# Patient Record
Sex: MALE | Race: OTHER | NOT HISPANIC OR LATINO
[De-identification: names, ages, dates, MRNs, and addresses within clinical notes are randomized per-mention and may not be internally consistent; named-entity substitution may affect disease eponyms.]

---

## 2024-10-23 NOTE — ASU PATIENT PROFILE, ADULT - NSICDXPASTSURGICALHX_GEN_ALL_CORE_FT
PAST SURGICAL HISTORY:  No significant past surgical history PAST SURGICAL HISTORY:  S/P tooth extraction Needed more local

## 2024-10-23 NOTE — ASU PATIENT PROFILE, ADULT - NSICDXPASTMEDICALHX_GEN_ALL_CORE_FT
PAST MEDICAL HISTORY:  No pertinent past medical history PAST MEDICAL HISTORY:  MARKOS (obstructive sleep apnea)     Swallowing problem

## 2024-10-24 ENCOUNTER — RESULT REVIEW (OUTPATIENT)
Age: 34
End: 2024-10-24

## 2024-10-24 ENCOUNTER — TRANSCRIPTION ENCOUNTER (OUTPATIENT)
Age: 34
End: 2024-10-24

## 2024-10-24 ENCOUNTER — APPOINTMENT (OUTPATIENT)
Dept: OTOLARYNGOLOGY | Facility: AMBULATORY SURGERY CENTER | Age: 34
End: 2024-10-24

## 2024-10-24 ENCOUNTER — OUTPATIENT (OUTPATIENT)
Dept: OUTPATIENT SERVICES | Facility: HOSPITAL | Age: 34
LOS: 1 days | Discharge: HOME | End: 2024-10-24
Payer: COMMERCIAL

## 2024-10-24 ENCOUNTER — INPATIENT (INPATIENT)
Facility: HOSPITAL | Age: 34
LOS: 1 days | Discharge: HOME | DRG: 156 | End: 2024-10-26
Attending: OTOLARYNGOLOGY | Admitting: OTOLARYNGOLOGY
Payer: COMMERCIAL

## 2024-10-24 VITALS
OXYGEN SATURATION: 97 % | HEART RATE: 83 BPM | RESPIRATION RATE: 22 BRPM | SYSTOLIC BLOOD PRESSURE: 137 MMHG | DIASTOLIC BLOOD PRESSURE: 88 MMHG

## 2024-10-24 VITALS
HEIGHT: 71 IN | HEART RATE: 83 BPM | WEIGHT: 265.88 LBS | DIASTOLIC BLOOD PRESSURE: 81 MMHG | TEMPERATURE: 98 F | OXYGEN SATURATION: 96 % | RESPIRATION RATE: 16 BRPM | SYSTOLIC BLOOD PRESSURE: 125 MMHG

## 2024-10-24 VITALS
TEMPERATURE: 98 F | SYSTOLIC BLOOD PRESSURE: 122 MMHG | RESPIRATION RATE: 15 BRPM | HEART RATE: 72 BPM | OXYGEN SATURATION: 95 % | DIASTOLIC BLOOD PRESSURE: 67 MMHG

## 2024-10-24 DIAGNOSIS — K08.409 PARTIAL LOSS OF TEETH, UNSPECIFIED CAUSE, UNSPECIFIED CLASS: Chronic | ICD-10-CM

## 2024-10-24 LAB
ANION GAP SERPL CALC-SCNC: 11 MMOL/L — SIGNIFICANT CHANGE UP (ref 5–17)
APTT BLD: 34.9 SEC — SIGNIFICANT CHANGE UP (ref 24.5–35.6)
BLD GP AB SCN SERPL QL: NEGATIVE — SIGNIFICANT CHANGE UP
BLD GP AB SCN SERPL QL: NEGATIVE — SIGNIFICANT CHANGE UP
BUN SERPL-MCNC: 15 MG/DL — SIGNIFICANT CHANGE UP (ref 7–23)
CALCIUM SERPL-MCNC: 9 MG/DL — SIGNIFICANT CHANGE UP (ref 8.4–10.5)
CHLORIDE SERPL-SCNC: 102 MMOL/L — SIGNIFICANT CHANGE UP (ref 96–108)
CO2 SERPL-SCNC: 22 MMOL/L — SIGNIFICANT CHANGE UP (ref 22–31)
CREAT SERPL-MCNC: 1.02 MG/DL — SIGNIFICANT CHANGE UP (ref 0.5–1.3)
EGFR: 100 ML/MIN/1.73M2 — SIGNIFICANT CHANGE UP
GLUCOSE BLDC GLUCOMTR-MCNC: 125 MG/DL — HIGH (ref 70–99)
GLUCOSE SERPL-MCNC: 138 MG/DL — HIGH (ref 70–99)
HCT VFR BLD CALC: 45.4 % — SIGNIFICANT CHANGE UP (ref 39–50)
HGB BLD-MCNC: 14.9 G/DL — SIGNIFICANT CHANGE UP (ref 13–17)
INR BLD: 1.04 — SIGNIFICANT CHANGE UP (ref 0.85–1.16)
MAGNESIUM SERPL-MCNC: 2 MG/DL — SIGNIFICANT CHANGE UP (ref 1.6–2.6)
MCHC RBC-ENTMCNC: 30.2 PG — SIGNIFICANT CHANGE UP (ref 27–34)
MCHC RBC-ENTMCNC: 32.8 GM/DL — SIGNIFICANT CHANGE UP (ref 32–36)
MCV RBC AUTO: 92.1 FL — SIGNIFICANT CHANGE UP (ref 80–100)
NRBC # BLD: 0 /100 WBCS — SIGNIFICANT CHANGE UP (ref 0–0)
PHOSPHATE SERPL-MCNC: 2.7 MG/DL — SIGNIFICANT CHANGE UP (ref 2.5–4.5)
PLATELET # BLD AUTO: 335 K/UL — SIGNIFICANT CHANGE UP (ref 150–400)
POTASSIUM SERPL-MCNC: 4.2 MMOL/L — SIGNIFICANT CHANGE UP (ref 3.5–5.3)
POTASSIUM SERPL-SCNC: 4.2 MMOL/L — SIGNIFICANT CHANGE UP (ref 3.5–5.3)
PROTHROM AB SERPL-ACNC: 12 SEC — SIGNIFICANT CHANGE UP (ref 9.9–13.4)
RBC # BLD: 4.93 M/UL — SIGNIFICANT CHANGE UP (ref 4.2–5.8)
RBC # FLD: 12.8 % — SIGNIFICANT CHANGE UP (ref 10.3–14.5)
RH IG SCN BLD-IMP: POSITIVE — SIGNIFICANT CHANGE UP
RH IG SCN BLD-IMP: POSITIVE — SIGNIFICANT CHANGE UP
SODIUM SERPL-SCNC: 135 MMOL/L — SIGNIFICANT CHANGE UP (ref 135–145)
WBC # BLD: 11.22 K/UL — HIGH (ref 3.8–10.5)
WBC # FLD AUTO: 11.22 K/UL — HIGH (ref 3.8–10.5)

## 2024-10-24 PROCEDURE — 88304 TISSUE EXAM BY PATHOLOGIST: CPT | Mod: 26

## 2024-10-24 PROCEDURE — 99291 CRITICAL CARE FIRST HOUR: CPT | Mod: GC

## 2024-10-24 PROCEDURE — 88305 TISSUE EXAM BY PATHOLOGIST: CPT | Mod: 26

## 2024-10-24 PROCEDURE — 42145 REPAIR PALATE PHARYNX/UVULA: CPT | Mod: GC

## 2024-10-24 RX ORDER — LIDOCAINE 50 MG/G
15 CREAM TOPICAL
Qty: 2 | Refills: 0
Start: 2024-10-24 | End: 2024-11-02

## 2024-10-24 RX ORDER — GABAPENTIN 800 MG/1
6 TABLET, FILM COATED ORAL
Qty: 180 | Refills: 0
Start: 2024-10-24 | End: 2024-11-02

## 2024-10-24 RX ORDER — LIDOCAINE HYDROCHLORIDE 40 MG/ML
20 SOLUTION TOPICAL ONCE
Refills: 0 | Status: COMPLETED | OUTPATIENT
Start: 2024-10-24 | End: 2024-10-24

## 2024-10-24 RX ORDER — HEPARIN SODIUM 10000 [USP'U]/ML
7500 INJECTION INTRAVENOUS; SUBCUTANEOUS EVERY 8 HOURS
Refills: 0 | Status: DISCONTINUED | OUTPATIENT
Start: 2024-10-24 | End: 2024-10-26

## 2024-10-24 RX ORDER — LIDOCAINE HCL 60 MG/3 ML
10 SYRINGE (ML) INJECTION ONCE
Refills: 0 | Status: DISCONTINUED | OUTPATIENT
Start: 2024-10-24 | End: 2024-10-24

## 2024-10-24 RX ORDER — INSULIN LISPRO 100/ML
VIAL (ML) SUBCUTANEOUS EVERY 6 HOURS
Refills: 0 | Status: DISCONTINUED | OUTPATIENT
Start: 2024-10-24 | End: 2024-10-26

## 2024-10-24 RX ORDER — HEPARIN SODIUM 10000 [USP'U]/ML
5000 INJECTION INTRAVENOUS; SUBCUTANEOUS EVERY 8 HOURS
Refills: 0 | Status: DISCONTINUED | OUTPATIENT
Start: 2024-10-24 | End: 2024-10-24

## 2024-10-24 RX ORDER — OXYCODONE HYDROCHLORIDE 30 MG/1
1 TABLET, FILM COATED, EXTENDED RELEASE ORAL
Qty: 20 | Refills: 0
Start: 2024-10-24

## 2024-10-24 RX ORDER — DEXAMETHASONE 1.5 MG 1.5 MG/1
6 TABLET ORAL EVERY 8 HOURS
Refills: 0 | Status: DISCONTINUED | OUTPATIENT
Start: 2024-10-24 | End: 2024-10-26

## 2024-10-24 RX ORDER — METHYLPREDNISOLONE ACETATE 80 MG/ML
1 INJECTION, SUSPENSION INTRA-ARTICULAR; INTRALESIONAL; INTRAMUSCULAR; SOFT TISSUE
Qty: 1 | Refills: 0
Start: 2024-10-24

## 2024-10-24 RX ORDER — FENTANYL CITRATE-0.9 % NACL/PF 300MCG/30
25 PATIENT CONTROLLED ANALGESIA VIAL INJECTION
Refills: 0 | Status: DISCONTINUED | OUTPATIENT
Start: 2024-10-24 | End: 2024-10-24

## 2024-10-24 RX ORDER — CHLORHEXIDINE GLUCONATE 40 MG/ML
1 SOLUTION TOPICAL DAILY
Refills: 0 | Status: DISCONTINUED | OUTPATIENT
Start: 2024-10-24 | End: 2024-10-26

## 2024-10-24 RX ORDER — SODIUM CHLORIDE IRRIG SOLUTION 0.9 %
500 SOLUTION, IRRIGATION IRRIGATION
Refills: 0 | Status: DISCONTINUED | OUTPATIENT
Start: 2024-10-24 | End: 2024-10-24

## 2024-10-24 RX ORDER — ACETAMINOPHEN 500 MG
1000 TABLET ORAL ONCE
Refills: 0 | Status: COMPLETED | OUTPATIENT
Start: 2024-10-25 | End: 2024-10-25

## 2024-10-24 RX ORDER — GLUCAGON INJECTION, SOLUTION 1 MG/.2ML
1 INJECTION, SOLUTION SUBCUTANEOUS ONCE
Refills: 0 | Status: DISCONTINUED | OUTPATIENT
Start: 2024-10-24 | End: 2024-10-26

## 2024-10-24 RX ORDER — AMOXICILLIN 250 MG/5ML
10 SUSPENSION, RECONSTITUTED, ORAL (ML) ORAL
Qty: 2 | Refills: 0
Start: 2024-10-24 | End: 2024-10-30

## 2024-10-24 RX ORDER — APREPITANT 125MG-80MG
40 KIT ORAL ONCE
Refills: 0 | Status: COMPLETED | OUTPATIENT
Start: 2024-10-24 | End: 2024-10-24

## 2024-10-24 RX ORDER — ACETAMINOPHEN 325 MG
20 TABLET ORAL
Qty: 800 | Refills: 0
Start: 2024-10-24 | End: 2024-11-02

## 2024-10-24 RX ORDER — SODIUM CHLORIDE IRRIG SOLUTION 0.9 %
500 SOLUTION, IRRIGATION IRRIGATION
Refills: 0 | Status: COMPLETED | OUTPATIENT
Start: 2024-10-24 | End: 2024-10-24

## 2024-10-24 RX ORDER — ACETAMINOPHEN 500 MG
1000 TABLET ORAL ONCE
Refills: 0 | Status: COMPLETED | OUTPATIENT
Start: 2024-10-24 | End: 2024-10-24

## 2024-10-24 RX ADMIN — Medication 100 MILLILITER(S): at 21:00

## 2024-10-24 RX ADMIN — Medication 400 MILLIGRAM(S): at 21:02

## 2024-10-24 RX ADMIN — Medication 25 MICROGRAM(S): at 15:31

## 2024-10-24 RX ADMIN — Medication 25 MICROGRAM(S): at 15:15

## 2024-10-24 RX ADMIN — LIDOCAINE HYDROCHLORIDE 20 MILLILITER(S): 40 SOLUTION TOPICAL at 22:16

## 2024-10-24 RX ADMIN — Medication 999 MILLILITER(S): at 15:24

## 2024-10-24 RX ADMIN — APREPITANT 40 MILLIGRAM(S): KIT at 09:25

## 2024-10-24 RX ADMIN — Medication 1000 MILLIGRAM(S): at 21:53

## 2024-10-24 RX ADMIN — HEPARIN SODIUM 7500 UNIT(S): 10000 INJECTION INTRAVENOUS; SUBCUTANEOUS at 22:17

## 2024-10-24 RX ADMIN — Medication 25 MICROGRAM(S): at 15:44

## 2024-10-24 RX ADMIN — DEXAMETHASONE 1.5 MG 6 MILLIGRAM(S): 1.5 TABLET ORAL at 21:00

## 2024-10-24 NOTE — H&P ADULT - HISTORY OF PRESENT ILLNESS
34yM with MARKOS s/p tonsillectomy and UPPP at [facility] 10/24 transferred to [facility] for overnight monitoring due to uvular edema, pain, and inability to tolerate PO. Patient was transferred for ICU level of care due to O2 requirements in setting of uvula edema. On arrival patient is comfortable and breathing comfortably on 2L NC satting 96%. Reports that the pain is unchanged. He reports difficulty breathing through his nose but can breathe well through his mouth. No stridor or stertor.

## 2024-10-24 NOTE — ASU DISCHARGE PLAN (ADULT/PEDIATRIC) - ASU DC SPECIAL INSTRUCTIONSFT
Soft diet for 2 weeks  Tylenol and Motrin as needed for pain control    Oxycodone every 4-6 hours as needed for severe pain  Call Dr. Beatty's office for follow-up. Soft diet for 2 weeks  Tylenol and Motrin as needed for pain control.  Gargle 3 times daily with ice water.   Take your antibiotic and steroid medication as prescribed.  Call Dr. Beatty's office for follow-up. Soft diet for 2 weeks  Tylenol and Motrin as needed for pain control.  Gargle 3 times daily with ice water. Gargle with viscous lidocaine when you have throat pain.  Take your antibiotic and steroid medication as prescribed.  Call Dr. Beatty's office for follow-up.

## 2024-10-24 NOTE — H&P ADULT - NSHPPHYSICALEXAM_GEN_ALL_CORE
ICU Vital Signs Last 24 Hrs  T(C): 36.8 (24 Oct 2024 19:20), Max: 36.8 (24 Oct 2024 09:02)  T(F): 98.2 (24 Oct 2024 19:20), Max: 98.2 (24 Oct 2024 09:02)  HR: 83 (24 Oct 2024 20:00) (70 - 83)  BP: 137/88 (24 Oct 2024 20:00) (79/52 - 137/88)  BP(mean): 108 (24 Oct 2024 20:00) (108 - 108)  ABP: --  ABP(mean): --  RR: 22 (24 Oct 2024 20:00) (15 - 22)  SpO2: 97% (24 Oct 2024 20:00) (92% - 97%)    O2 Parameters below as of 24 Oct 2024 20:00  Patient On (Oxygen Delivery Method): nasal cannula  O2 Flow (L/min): 2        PHYSICAL EXAM:    CONSTITUTIONAL: Well nourished, well developed, NON-DYSMORPHIC, and in no acute distress.    HEAD: normocephalic, atraumatic.  EARS: The right/left pinna was normal.   NOSE: Normal external nose. Anterior nasal cavity patent with no obstruction. Inferior turbinates normally sized.  ORAL CAVITY/OROPHARYNX: s/p tonsillectomy and UPPP. No bleeding. Significant uvular edema  NECK: No cervical lymphadenopathy  RESPIRATORY: Respirations unlabored, no increased work of breathing with use of accessory muscles and retractions. No stridor.  CARDIAC: Warm extremities, no cyanosis.

## 2024-10-24 NOTE — ASU DISCHARGE PLAN (ADULT/PEDIATRIC) - FINANCIAL ASSISTANCE
[facility] provides services at a reduced cost to those who are determined to be eligible through [facility]’s financial assistance program. Information regarding [facility]’s financial assistance program can be found by going to [email or web address] or by calling [phone].

## 2024-10-24 NOTE — BRIEF OPERATIVE NOTE - OPERATION/FINDINGS
uvulopalatopharyngoplasty  bl tonsillectomy uvulopalatopharyngoplasty  bl tonsillectomy  Uvula remnant swollen

## 2024-10-24 NOTE — CONSULT NOTE ADULT - SUBJECTIVE AND OBJECTIVE BOX
====SURGERY ICU CONSULT====      RADHA LINARES  [id number]    HPI:   35yo M  w/ PMH MARKOS, MO, GERD and no PSH who presented to [facility] for elective uvulopalatopharyngoplasty and tonsillectomy in setting of MARKOS and poss chronic cryptic tonsilitis. Pt now s/p  uvulopalatopharyngoplasty and tonsillectomy (1L crystal, EBL 10) c/b dysphagia in PACU w/ no sign of respiratory difficulty or stridor.     Surgical ICU consulted for airway monitoring in setting of orophryngeal surgery and postoperative dysphagia. Pt seen and examined at bedside w/ ICU team. Pt lying comfortably in bed, no sign of inc WOB, difficulty breathing or stridor.     On exam, oropharynx is edematous w/ no visbile sign of obstruction       PMH: none  PSH: no prior surgical hx  Meds: none, prescribed CPAP at home however does not use it   Allergies: NKDA            LABS:                    GENERAL: NAD, lying in bed comfortably  HEAD:  Atraumatic, normocephalic  ORAL: visibly edematous orppharynx w/ no sign of obstruction   EYES: EOMI, PERRLA, conjunctiva and sclera clear  NECK: Supple, trachea midline, no JVD, no stridor auscultated   HEART: Regular rate and rhythm, no murmurs, rubs, or gallops  LUNGS: Unlabored respirations.  Clear to auscultation bilaterally, no crackles, wheezing, or rhonchi  ABDOMEN: Soft, nontender, nondistended, +BS  EXTREMITIES: 2+ peripheral pulses bilaterally. No clubbing, cyanosis, or edema  NERVOUS SYSTEM:  A&Ox3, moving all extremities, no focal deficits   SKIN: No rashes or lesions          35yo M  w/ PMH MARKOS, MO, GERD and no PSH who presented to [facility] for elective uvulopalatopharyngoplasty and tonsillectomy in setting of MARKOS and poss chronic cryptic tonsilitis. Pt now s/p  uvulopalatopharyngoplasty and tonsillectomy (1L crystal, EBL 10) c/b dysphagia in PACU w/ no sign of respiratory difficulty or stridor. Pt doing well, denies difficulty breathing. Endorses continued difficulty w/ swallowing; will give viscous lidocaine for symptomatic relief and start decadron 6q8 to dec edema.     Neuro: Tylenol and Toradol PRN  HEENT: Palital and posterior pharynx swelling: Will get viscous lidocaine to help with pain. Cont Decadron 6q8 standing    CV: HD stable LR @100   Pulm: Hx of MARKOS- cont CPAP 40%10/5  GI: NPO   : Voids  ID: None  Endo: ISS  PPx: SCD no SQH  Lines: piv  Wounds: Oral Incisions c/d/i  No drains   PT/OT: Not ordered   ====SURGERY ICU CONSULT====      RADHA LINARES  [id number]    HPI:   35yo M  w/ PMH MARKOS, MO, GERD and no PSH who presented to [facility] for elective uvulopalatopharyngoplasty and tonsillectomy in setting of MARKOS and poss chronic cryptic tonsilitis. Pt now s/p  uvulopalatopharyngoplasty and tonsillectomy (1L crystal, EBL 10) c/b dysphagia in PACU w/ no sign of respiratory difficulty or stridor.     Surgical ICU consulted for airway monitoring in setting of orophryngeal surgery and postoperative dysphagia. Pt seen and examined at bedside w/ ICU team. Pt lying comfortably in bed, no sign of inc WOB, difficulty breathing or stridor.     On exam, oropharynx is edematous w/ no visbile sign of obstruction       PMH: none  PSH: no prior surgical hx  Meds: none, prescribed CPAP at home however does not use it   Allergies: NKDA            LABS:                    GENERAL: NAD, lying in bed comfortably  HEAD:  Atraumatic, normocephalic  ORAL: visibly edematous orppharynx w/ no sign of obstruction   EYES: EOMI, PERRLA, conjunctiva and sclera clear  NECK: Supple, trachea midline, no JVD, no stridor auscultated   HEART: Regular rate and rhythm, no murmurs, rubs, or gallops  LUNGS: Unlabored respirations.  Clear to auscultation bilaterally, no crackles, wheezing, or rhonchi  ABDOMEN: Soft, nontender, nondistended, +BS  EXTREMITIES: 2+ peripheral pulses bilaterally. No clubbing, cyanosis, or edema  NERVOUS SYSTEM:  A&Ox3, moving all extremities, no focal deficits   SKIN: No rashes or lesions          35yo M  w/ PMH MARKOS, MO, GERD and no PSH who presented to [facility] for elective uvulopalatopharyngoplasty and tonsillectomy in setting of MARKOS and poss chronic cryptic tonsilitis. Pt now s/p  uvulopalatopharyngoplasty and tonsillectomy (1L crystal, EBL 10) c/b dysphagia in PACU w/ no sign of respiratory difficulty or stridor. Pt doing well, denies difficulty breathing. Endorses continued difficulty w/ swallowing; will give viscous lidocaine for symptomatic relief and start decadron 6q8 to dec edema.     Neuro: Tylenol and Toradol PRN  HEENT: Palital and posterior pharynx swelling: Will get viscous lidocaine to help with pain. Cont Decadron 6q8 standing    CV: HD stable LR @100   Pulm: Hx of MARKOS- cont CPAP 40%10/5  GI: NPO   : Voids  ID: None  Endo: ISS  PPx: SCD, SQH  Lines: piv  Wounds: Oral Incisions c/d/i  No drains   PT/OT: Not ordered

## 2024-10-24 NOTE — BRIEF OPERATIVE NOTE - NSICDXBRIEFPROCEDURE_GEN_ALL_CORE_FT
PROCEDURES:  Uvulopalatopharyngoplasty 24-Oct-2024 12:48:39  Maria Luisa Garzon  Tonsillectomy, adult 24-Oct-2024 12:48:46  Maria Luisa Garzon

## 2024-10-24 NOTE — CONSULT NOTE ADULT - ATTENDING COMMENTS
34 M  w/ MARKOS, MO, GERD, and cryptic tonsilitis underwent elective uvulopalatopharyngoplasty and tonsillectomy. Postop course complicated by edema of the uvula remnant and soft palate with difficulty breathing and swallowing. Physical exam as above.   1. Pharyngeal edema  2. MARKOS  - viscous lidocaine for pain control  - dexamethasone  - CPAP  - in ICU due to high risk for airway obstruction and devastating complication

## 2024-10-24 NOTE — H&P ADULT - ASSESSMENT
34yM MARKOS s/p tonsillectomy and UPPP 10/24 at [facility], transferred to [facility] due to uvular edema, pain, and inability to tolerate PO. Patient transferred to ICU for O2 requirements. Hemodynamically stable, breathing comfortably on 2L NC, no respiratory distress or stridor.     - CLD as tolerated  - decadron 6 q8  - opioids PRN for pain  - ok for toradol PRN for breakthrough  - viscous lidocaine PRN  - Ok for CPAP if needed  - Nasal trumpet PRN  - continue airway monitoring  - d/w Dr. Beatty

## 2024-10-24 NOTE — ASU DISCHARGE PLAN (ADULT/PEDIATRIC) - CARE PROVIDER_API CALL
Flor Beatty  Otolaryngology  [address]  [place] [address]  Phone: [phone]  Fax: [phone]  Follow Up Time:

## 2024-10-25 PROBLEM — G47.33 OBSTRUCTIVE SLEEP APNEA (ADULT) (PEDIATRIC): Chronic | Status: ACTIVE | Noted: 2024-10-24

## 2024-10-25 PROBLEM — R13.10 DYSPHAGIA, UNSPECIFIED: Chronic | Status: ACTIVE | Noted: 2024-10-24

## 2024-10-25 LAB
ANION GAP SERPL CALC-SCNC: 10 MMOL/L — SIGNIFICANT CHANGE UP (ref 5–17)
BUN SERPL-MCNC: 17 MG/DL — SIGNIFICANT CHANGE UP (ref 7–23)
CALCIUM SERPL-MCNC: 8.8 MG/DL — SIGNIFICANT CHANGE UP (ref 8.4–10.5)
CHLORIDE SERPL-SCNC: 101 MMOL/L — SIGNIFICANT CHANGE UP (ref 96–108)
CO2 SERPL-SCNC: 23 MMOL/L — SIGNIFICANT CHANGE UP (ref 22–31)
CREAT SERPL-MCNC: 0.97 MG/DL — SIGNIFICANT CHANGE UP (ref 0.5–1.3)
EGFR: 106 ML/MIN/1.73M2 — SIGNIFICANT CHANGE UP
GLUCOSE BLDC GLUCOMTR-MCNC: 119 MG/DL — HIGH (ref 70–99)
GLUCOSE BLDC GLUCOMTR-MCNC: 124 MG/DL — HIGH (ref 70–99)
GLUCOSE BLDC GLUCOMTR-MCNC: 135 MG/DL — HIGH (ref 70–99)
GLUCOSE SERPL-MCNC: 139 MG/DL — HIGH (ref 70–99)
HCT VFR BLD CALC: 44.8 % — SIGNIFICANT CHANGE UP (ref 39–50)
HGB BLD-MCNC: 15.1 G/DL — SIGNIFICANT CHANGE UP (ref 13–17)
MAGNESIUM SERPL-MCNC: 2 MG/DL — SIGNIFICANT CHANGE UP (ref 1.6–2.6)
MCHC RBC-ENTMCNC: 31.1 PG — SIGNIFICANT CHANGE UP (ref 27–34)
MCHC RBC-ENTMCNC: 33.7 GM/DL — SIGNIFICANT CHANGE UP (ref 32–36)
MCV RBC AUTO: 92.2 FL — SIGNIFICANT CHANGE UP (ref 80–100)
NRBC # BLD: 0 /100 WBCS — SIGNIFICANT CHANGE UP (ref 0–0)
PHOSPHATE SERPL-MCNC: 3.5 MG/DL — SIGNIFICANT CHANGE UP (ref 2.5–4.5)
PLATELET # BLD AUTO: 327 K/UL — SIGNIFICANT CHANGE UP (ref 150–400)
POTASSIUM SERPL-MCNC: 4.2 MMOL/L — SIGNIFICANT CHANGE UP (ref 3.5–5.3)
POTASSIUM SERPL-SCNC: 4.2 MMOL/L — SIGNIFICANT CHANGE UP (ref 3.5–5.3)
RBC # BLD: 4.86 M/UL — SIGNIFICANT CHANGE UP (ref 4.2–5.8)
RBC # FLD: 12.6 % — SIGNIFICANT CHANGE UP (ref 10.3–14.5)
SODIUM SERPL-SCNC: 134 MMOL/L — LOW (ref 135–145)
WBC # BLD: 14.46 K/UL — HIGH (ref 3.8–10.5)
WBC # FLD AUTO: 14.46 K/UL — HIGH (ref 3.8–10.5)

## 2024-10-25 PROCEDURE — 99232 SBSQ HOSP IP/OBS MODERATE 35: CPT | Mod: GC

## 2024-10-25 RX ORDER — INFLUENZ VIR VAC TV P-SURF2003 15MCG/.5ML
0.5 SYRINGE (ML) INTRAMUSCULAR ONCE
Refills: 0 | Status: DISCONTINUED | OUTPATIENT
Start: 2024-10-25 | End: 2024-10-26

## 2024-10-25 RX ORDER — KETOROLAC TROMETHAMINE 30 MG/ML
15 INJECTION INTRAMUSCULAR; INTRAVENOUS EVERY 6 HOURS
Refills: 0 | Status: DISCONTINUED | OUTPATIENT
Start: 2024-10-25 | End: 2024-10-26

## 2024-10-25 RX ORDER — ACETAMINOPHEN 500 MG
1000 TABLET ORAL EVERY 6 HOURS
Refills: 0 | Status: DISCONTINUED | OUTPATIENT
Start: 2024-10-25 | End: 2024-10-26

## 2024-10-25 RX ADMIN — Medication 1 LOZENGE: at 15:00

## 2024-10-25 RX ADMIN — DEXAMETHASONE 1.5 MG 6 MILLIGRAM(S): 1.5 TABLET ORAL at 13:08

## 2024-10-25 RX ADMIN — Medication 1 LOZENGE: at 13:08

## 2024-10-25 RX ADMIN — HEPARIN SODIUM 7500 UNIT(S): 10000 INJECTION INTRAVENOUS; SUBCUTANEOUS at 13:08

## 2024-10-25 RX ADMIN — Medication 400 MILLIGRAM(S): at 02:36

## 2024-10-25 RX ADMIN — DEXAMETHASONE 1.5 MG 6 MILLIGRAM(S): 1.5 TABLET ORAL at 05:28

## 2024-10-25 RX ADMIN — Medication 1000 MILLIGRAM(S): at 03:34

## 2024-10-25 RX ADMIN — HEPARIN SODIUM 7500 UNIT(S): 10000 INJECTION INTRAVENOUS; SUBCUTANEOUS at 05:28

## 2024-10-25 RX ADMIN — DEXAMETHASONE 1.5 MG 6 MILLIGRAM(S): 1.5 TABLET ORAL at 21:36

## 2024-10-25 RX ADMIN — HEPARIN SODIUM 7500 UNIT(S): 10000 INJECTION INTRAVENOUS; SUBCUTANEOUS at 21:36

## 2024-10-25 NOTE — PROGRESS NOTE ADULT - SUBJECTIVE AND OBJECTIVE BOX
OTOLARYNGOLOGY (ENT) PROGRESS NOTE    PATIENT: RADHA LINARES  MRN: [id number]  : 92  [id number]  DATE OF SERVICE:  10-25-24  			         ID:RADHA LINARES is 34yM with MARKOS s/p tonsillectomy and UPPP at [facility] 10/24 transferred to [facility] for overnight monitoring due to uvular edema, pain, and inability to tolerate PO. Patient was transferred for ICU level of care due to O2 requirements in setting of uvula edema. On arrival patient is comfortable and breathing comfortably on 2L NC satting 96%. Reports that the pain is unchanged. He reports difficulty breathing through his nose but can breathe well through his mouth. No stridor or stertor.       Subjective/ Interval:   10/25; AFVSS, no recorded desats overnight, on 2 L NC this morning, Patient laying flat with no stertor or stridor, Aim to try PO today, normal post op edema, no bleeding   ALLERGIES:  No Known Allergies      MEDICATIONS:  Antiinfectives:     IV fluids:  dextrose 5%. 1000 milliLiter(s) IV Continuous <Continuous>  dextrose 5%. 1000 milliLiter(s) IV Continuous <Continuous>  lactated ringers. 1000 milliLiter(s) IV Continuous <Continuous>    Hematologic/Anticoagulation:  heparin   Injectable 7500 Unit(s) SubCutaneous every 8 hours    Pain medications/Neuro:  acetaminophen   IVPB .. 1000 milliGRAM(s) IV Intermittent every 6 hours PRN    Endocrine Medications:   dexAMETHasone  Injectable 6 milliGRAM(s) IV Push every 8 hours  dextrose 50% Injectable 12.5 Gram(s) IV Push once  dextrose 50% Injectable 25 Gram(s) IV Push once  dextrose 50% Injectable 25 Gram(s) IV Push once  dextrose Oral Gel 15 Gram(s) Oral once PRN  glucagon  Injectable 1 milliGRAM(s) IntraMuscular once  insulin lispro (ADMELOG) corrective regimen sliding scale   SubCutaneous every 6 hours    All other standing medications:   chlorhexidine 2% Cloths 1 Application(s) Topical daily  influenza   Vaccine 0.5 milliLiter(s) IntraMuscular once    All other PRN medications:    Vital Signs Last 24 Hrs  T(C): 36.7 (25 Oct 2024 05:10), Max: 37.1 (24 Oct 2024 20:37)  T(F): 98.1 (25 Oct 2024 05:10), Max: 98.8 (24 Oct 2024 20:37)  HR: 73 (25 Oct 2024 08:00) (65 - 83)  BP: 109/65 (25 Oct 2024 08:00) (79/52 - 137/88)  BP(mean): 81 (25 Oct 2024 08:00) (68 - 108)  RR: 16 (25 Oct 2024 08:00) (13 - 23)  SpO2: 96% (25 Oct 2024 08:00) (91% - 98%)    Parameters below as of 25 Oct 2024 08:00  Patient On (Oxygen Delivery Method): nasal cannula w/ humidification  O2 Flow (L/min): 2        10-24 @ 07:  -  10-25 @ 07:00  --------------------------------------------------------  IN:    IV PiggyBack: 200 mL    Lactated Ringers: 900 mL  Total IN: 1100 mL    OUT:    Voided (mL): 1000 mL  Total OUT: 1000 mL    Total NET: 100 mL      10-25 @ 07:01  -  10-25 @ 08:37  --------------------------------------------------------  IN:    Lactated Ringers: 100 mL  Total IN: 100 mL    OUT:    Voided (mL): 0 mL  Total OUT: 0 mL    Total NET: 100 mL      PHYSICAL EXAM:  GEN: appears stated age, NAD  NEURO: alert & oriented x 4/4  NOSE: Normal external nose. Anterior nasal cavity patent with no obstruction. Inferior turbinates normally sized.  ORAL CAVITY/OROPHARYNX: s/p tonsillectomy and UPPP. No bleeding. Significant uvular edema  NECK: No cervical lymphadenopathy  RESPIRATORY: Respirations unlabored, no increased work of breathing with use of accessory muscles and retractions. No stridor.  CARDIAC: Warm extremities, no cyanosis.      LABS                       15.1   14.46 )-----------( 327      ( 25 Oct 2024 04:54 )             44.8    10-25    134[L]  |  101  |  17  ----------------------------<  139[H]  4.2   |  23  |  0.97    Ca    8.8      25 Oct 2024 04:54  Phos  3.5     10-25  Mg     2.0     10-25           Coagulation Studies-   PT/INR - ( 24 Oct 2024 20:46 )   PT: 12.0 sec;   INR: 1.04          PTT - ( 24 Oct 2024 20:46 )  PTT:34.9 sec  Urinalysis Basic - ( 25 Oct 2024 04:54 )    Color: x / Appearance: x / SG: x / pH: x  Gluc: 139 mg/dL / Ketone: x  / Bili: x / Urobili: x   Blood: x / Protein: x / Nitrite: x   Leuk Esterase: x / RBC: x / WBC x   Sq Epi: x / Non Sq Epi: x / Bacteria: x      Endocrine Panel-  Calcium: 8.8 mg/dL (10-25 @ 04:54)  Calcium: 9.0 mg/dL (10-24 @ 20:46)

## 2024-10-25 NOTE — DIETITIAN INITIAL EVALUATION ADULT - ADD RECOMMEND
1. NPO  **as medically feasible, advance diet as tolerated to Clear liquids provide nourishments and/or oral nutrition supplements per pt preference   >>Consider SLP eval to better assess chewing/swallow abilities   2. When feasible monitor PO intake, diet tolerance, weight trends, labs, and skin integrity and bowel movement regularity.   3. Pain and bowel regimen per team   4. RDN will continue to monitor, reassess, and intervene as appropriate.  1. NPO  **as medically feasible, advance diet as tolerated to Clear liquids provide nourishments and/or oral nutrition supplements per pt preference  2. When feasible monitor PO intake, diet tolerance, weight trends, labs, and skin integrity and bowel movement regularity.   3. Pain and bowel regimen per team   4. RDN will continue to monitor, reassess, and intervene as appropriate.

## 2024-10-25 NOTE — PROGRESS NOTE ADULT - SUBJECTIVE AND OBJECTIVE BOX
INTERVAL/OVERNIGHT EVENTS: Admitted after uvulopalatopharyngoplasty and tonsillectomy for airway monitoring in the setting of post-operative dysphagia and history of MARKOS.     SUBJECTIVE: This AM, doing well. Main complaint is pain and soreness around surgical site that causes dysphagia. Denies choking sensation. Feels a tightness there that makes him take very slow breaths. No N/V or abd pain. No LH/dizziness. Voids without issues.     Neurologic Medications  acetaminophen   IVPB .. 1000 milliGRAM(s) IV Intermittent every 6 hours PRN Mild Pain (1 - 3)      Hematologic/Oncologic Medications  heparin   Injectable 7500 Unit(s) SubCutaneous every 8 hours  influenza   Vaccine 0.5 milliLiter(s) IntraMuscular once    Endocrine/Metabolic Medications  dexAMETHasone  Injectable 6 milliGRAM(s) IV Push every 8 hours  glucagon  Injectable 1 milliGRAM(s) IntraMuscular once  insulin lispro (ADMELOG) corrective regimen sliding scale   SubCutaneous every 6 hours    Topical/Other Medications  benzocaine/menthol Lozenge 1 Lozenge Oral every 2 hours PRN Sore Throat  chlorhexidine 2% Cloths 1 Application(s) Topical daily    MEDICATIONS  (PRN):  acetaminophen   IVPB .. 1000 milliGRAM(s) IV Intermittent every 6 hours PRN Mild Pain (1 - 3)  benzocaine/menthol Lozenge 1 Lozenge Oral every 2 hours PRN Sore Throat    I&O's Detail    24 Oct 2024 07:01  -  25 Oct 2024 07:00  --------------------------------------------------------  IN:    IV PiggyBack: 200 mL    Lactated Ringers: 900 mL  Total IN: 1100 mL    OUT:    Voided (mL): 1000 mL  Total OUT: 1000 mL    Total NET: 100 mL    25 Oct 2024 07:01  -  25 Oct 2024 12:39  --------------------------------------------------------  IN:    Lactated Ringers: 300 mL  Total IN: 300 mL    OUT:    Voided (mL): 750 mL  Total OUT: 750 mL    Total NET: -450 mL    Vital Signs Last 24 Hrs  T(C): 36.5 (25 Oct 2024 09:44), Max: 37.1 (24 Oct 2024 20:37)  T(F): 97.7 (25 Oct 2024 09:44), Max: 98.8 (24 Oct 2024 20:37)  HR: 82 (25 Oct 2024 12:00) (65 - 85)  BP: 113/56 (25 Oct 2024 12:00) (79/52 - 137/88)  BP(mean): 80 (25 Oct 2024 12:00) (68 - 108)  RR: 18 (25 Oct 2024 12:00) (13 - 23)  SpO2: 93% (25 Oct 2024 12:00) (91% - 98%)    Parameters below as of 25 Oct 2024 12:00  Patient On (Oxygen Delivery Method): room air    GENERAL: NAD, resting comfortably in bed, AxOx3  HEENT: NCAT, MMM, no hoarseness or stridor, peritonsilar surgical site c/d/i with erythema and edema. No drainage or bleeding.   C/V: Normal rate, normal peripheral perfusion, no murmurs.   PULM: Nonlabored breathing, no respiratory distress, CTA b/l  ABD: Soft, ND, NT, no rebound tenderness, no guarding  EXTREM: WWP, no edema, SCDs in place, cap refill <2 sec  NEURO: No focal deficits    LABS:             15.1   14.46 )-----------( 327      ( 25 Oct 2024 04:54 )             44.8     10-25    134[L]  |  101  |  17  ----------------------------<  139[H]  4.2   |  23  |  0.97    Ca    8.8      25 Oct 2024 04:54  Phos  3.5     10-25  Mg     2.0     10-25    PT/INR - ( 24 Oct 2024 20:46 )   PT: 12.0 sec;   INR: 1.04        PTT - ( 24 Oct 2024 20:46 )  PTT:34.9 sec  Urinalysis Basic - ( 25 Oct 2024 04:54 )    Color: x / Appearance: x / SG: x / pH: x  Gluc: 139 mg/dL / Ketone: x  / Bili: x / Urobili: x   Blood: x / Protein: x / Nitrite: x   Leuk Esterase: x / RBC: x / WBC x   Sq Epi: x / Non Sq Epi: x / Bacteria: x

## 2024-10-25 NOTE — PATIENT PROFILE ADULT - FALL HARM RISK - HARM RISK INTERVENTIONS

## 2024-10-25 NOTE — DIETITIAN INITIAL EVALUATION ADULT - PERTINENT MEDS FT
MEDICATIONS  (STANDING):  chlorhexidine 2% Cloths 1 Application(s) Topical daily  dexAMETHasone  Injectable 6 milliGRAM(s) IV Push every 8 hours  dextrose 5%. 1000 milliLiter(s) (50 mL/Hr) IV Continuous <Continuous>  dextrose 5%. 1000 milliLiter(s) (100 mL/Hr) IV Continuous <Continuous>  dextrose 50% Injectable 25 Gram(s) IV Push once  dextrose 50% Injectable 25 Gram(s) IV Push once  dextrose 50% Injectable 12.5 Gram(s) IV Push once  glucagon  Injectable 1 milliGRAM(s) IntraMuscular once  heparin   Injectable 7500 Unit(s) SubCutaneous every 8 hours  influenza   Vaccine 0.5 milliLiter(s) IntraMuscular once  insulin lispro (ADMELOG) corrective regimen sliding scale   SubCutaneous every 6 hours  lactated ringers. 1000 milliLiter(s) (100 mL/Hr) IV Continuous <Continuous>    MEDICATIONS  (PRN):  acetaminophen   IVPB .. 1000 milliGRAM(s) IV Intermittent every 6 hours PRN Mild Pain (1 - 3)  dextrose Oral Gel 15 Gram(s) Oral once PRN Blood Glucose LESS THAN 70 milliGRAM(s)/deciliter

## 2024-10-25 NOTE — DIETITIAN INITIAL EVALUATION ADULT - OTHER INFO
"33yo M  w/ PMH MARKOS, MO, GERD and no PSH who presented to [facility] for elective uvulopalatopharyngoplasty and tonsillectomy in setting of MARKOS and poss chronic cryptic tonsilitis. Pt now s/p  uvulopalatopharyngoplasty and tonsillectomy (1L crystal, EBL 10) c/b dysphagia in PACU w/ no sign of respiratory difficulty or stridor. Pt doing well, denies difficulty breathing. Endorses continued difficulty w/ swallowing; will give viscous lidocaine for symptomatic relief and start decadron 6q8 to dec edema. "     Visited pt at bedside on 5EA and discussed about pt in SICU rounds. MAP > 65, 81 at time of visit, switched to room air. Family present at bedside able to participate in nutrition evaluation. States that home pt has a really good appetite, consuming 3+ meals per day. Does not follow any therapeutic diet at home. States that he limits consumption of fruits and veggies and typically eats fast food as a personal preference. No cultural, ethnic, Orthodoxy food preferences noted. Confirms No known food allergies. Pt reports no vitamin/mineral supplementation at home. Reports no additional use of oral nutritional supplement. States that previously he had some pain in this throat and mouth when he tries to consume foods. Pt could benefit from SLP evaluation to better assess chewing/swallowing capabilities. Current diet: NPO at this time. No issues with N/V/C/D at this time. States that he has a BM PTA, and does not feel constipated. 0/10 pain per flow sheets. No Pressure Injuries per flow sheets. Erlin Score: 21. No Edema per flow sheets. Meds reviewed. Ordered for lactated ringers, Insulin Sliding Scale, decadron. Nutritionally Pertinent Labs 10/25 POCT ranges 125-135 past 24 hours. Na: 134 (L), Gluc: 139 (H). See Nutrition recommendations below.  "35yo M  w/ PMH MARKOS, MO, GERD and no PSH who presented to [facility] for elective uvulopalatopharyngoplasty and tonsillectomy in setting of MARKOS and poss chronic cryptic tonsilitis. Pt now s/p  uvulopalatopharyngoplasty and tonsillectomy (1L crystal, EBL 10) c/b dysphagia in PACU w/ no sign of respiratory difficulty or stridor. Pt doing well, denies difficulty breathing. Endorses continued difficulty w/ swallowing; will give viscous lidocaine for symptomatic relief and start decadron 6q8 to dec edema. "     Visited pt at bedside on 5EA and discussed about pt in SICU rounds. MAP > 65, 81 at time of visit, switched to room air. Family present at bedside able to participate in nutrition evaluation. States that home pt has a really good appetite, consuming 3+ meals per day. Does not follow any therapeutic diet at home. States that he limits consumption of fruits and veggies and typically eats fast food as a personal preference. No cultural, ethnic, Scientology food preferences noted. Confirms No known food allergies. Pt reports no vitamin/mineral supplementation at home. Reports no additional use of oral nutritional supplement. States that previously he had some pain in this throat and mouth when he tries to consume foods. Pt could benefit from SLP evaluation to better assess chewing/swallowing capabilities. Current diet: NPO at this time. No issues with N/V/C/D at this time. States that he has a BM PTA, and does not feel constipated. 0/10 pain per flow sheets. No Pressure Injuries per flow sheets. Erlin Score: 21. No Edema per flow sheets. Meds reviewed. Ordered for lactated ringers, Insulin Sliding Scale, decadron. Nutritionally Pertinent Labs 10/25 POCT ranges 125-135 past 24 hours. Na: 134 (L), Gluc: 139 (H). Dosing weight: 10/24 274 pounds, UBW: 274 pounds x 3 months. Endorses stable weight. IBW: 172 pounds, %IBW: 160%. Observed with no overt signs and symptoms of muscle or fat wasting. Based on ASPEN guidelines, pt does not meet criteria for malnutrition.  See Nutrition recommendations below.  "33yo M  w/ PMH MARKOS, MO, GERD and no PSH who presented to [facility] for elective uvulopalatopharyngoplasty and tonsillectomy in setting of MARKOS and poss chronic cryptic tonsilitis. Pt now s/p  uvulopalatopharyngoplasty and tonsillectomy (1L crystal, EBL 10) c/b dysphagia in PACU w/ no sign of respiratory difficulty or stridor. Pt doing well, denies difficulty breathing. Endorses continued difficulty w/ swallowing; will give viscous lidocaine for symptomatic relief and start decadron 6q8 to dec edema. "     Visited pt at bedside on 5EA and discussed about pt in SICU rounds. MAP > 65, 81 at time of visit, switched to room air. Family present at bedside able to participate in nutrition evaluation. States that home pt has a really good appetite, consuming 3+ meals per day. Does not follow any therapeutic diet at home. States that he limits consumption of fruits and veggies and typically eats fast food as a personal preference. No cultural, ethnic, Christian food preferences noted. Confirms No known food allergies. Pt reports no vitamin/mineral supplementation at home. Reports no additional use of oral nutritional supplement. States that previously he had some pain in this mouth when he tries to consume foods. Current diet: NPO at this time. Plan for potential diet advancement to clears. No issues with N/V/C/D at this time. States that he has a BM PTA, and does not feel constipated. 0/10 pain per flow sheets. No Pressure Injuries per flow sheets. Erlin Score: 21. No Edema per flow sheets. Meds reviewed. Ordered for lactated ringers, Insulin Sliding Scale, decadron. Nutritionally Pertinent Labs 10/25 POCT ranges 125-135 past 24 hours. Na: 134 (L), Gluc: 139 (H). Dosing weight: 10/24 274 pounds, UBW: 274 pounds x 3 months. Endorses stable weight. IBW: 172 pounds, %IBW: 160%. Observed with no overt signs and symptoms of muscle or fat wasting. Based on ASPEN guidelines, pt does not meet criteria for malnutrition.  See Nutrition recommendations below.

## 2024-10-25 NOTE — PROGRESS NOTE ADULT - NS ATTEND AMEND GEN_ALL_CORE FT
MARKOS with dysphagia and transient hypoxemia after uvulopalatopharyngoplasty  physical as above  breathing now comfortable, 95% saturation on RA, still with some atelectasis  CLD  viscous lidocaine as needed  decadron

## 2024-10-25 NOTE — PROGRESS NOTE ADULT - ASSESSMENT
Assessment and Plan:  RADHA LINARES is a  34yM MARKOS s/p tonsillectomy and UPPP 10/24 at [facility], transferred to [facility] due to uvular edema, pain, and inability to tolerate PO. Patient transferred to ICU for O2 requirements. Hemodynamically stable, breathing comfortably on 2L NC, no respiratory distress or stridor.  Laying flat and sleeping with no stridor or stertor     Plan   - CLD - advance as tolerated   - decadron 6 q8  - opioids PRN for pain  - ok for toradol PRN for breakthrough  - viscous lidocaine PRN  - Ok for CPAP if needed  - Nasal trumpet PRN  - step down vs discharge today   - Discussed with Dr. Beatty who agrees   [facility] at [phone] with any questions/concerns.    Linda Drake PA-C  10-25-24 @ 08:37

## 2024-10-25 NOTE — DIETITIAN INITIAL EVALUATION ADULT - OTHER CALCULATIONS
Ideal body weight (78.0kg) used to estimate needs as patient is >100% (160%) of [place] BW. Needs adjusted for clinical course and post op healing.

## 2024-10-25 NOTE — DIETITIAN INITIAL EVALUATION ADULT - EDUCATION DIETARY MODIFICATIONS
Discussed about possible diet progression. Answered questions related to nutrition and diet./(2) meets goals/outcomes/verbalization

## 2024-10-25 NOTE — PROGRESS NOTE ADULT - ASSESSMENT
34M with PMH MARKOS, MO, GERD and no PSH who presented to [facility] for elective uvulopalatopharyngoplasty and tonsillectomy in setting of MARKOS and possible chronic cryptic tonsilitis. Pt now s/p uvulopalatopharyngoplasty and tonsillectomy (10/24) c/b dysphagia in PACU without stridor. Transferred to [facility] SICU for overnight airway monitoring. Doing well this AM.     Neuro: Pain: Tylenol PRN and Hurricaine throat spray.  HEENT: Pallatal and posterior pharynx swelling: was given viscous lidocaine and Decadron 6q8 standing.    CV: HD stable, euvolemic, well-perfused. Can HLIV as pt with great UOP.   Pulm: Hx of MARKOS--will arrange information for outpatient follow-up with sleep specialist. Prior at-home study was read by Dr. Janusz Almaguer.   GI: Diet advanced to CLD, if tolerates can advance to FLD.   : Voids  ID: None  Endo: ISS  PPx: SCD, no need for SQH  Lines: PIV  Wounds: Oral Incisions c/d/i, No drains   PT/OT: Not needed  Dispo: SDU

## 2024-10-25 NOTE — DIETITIAN INITIAL EVALUATION ADULT - PERTINENT LABORATORY DATA
10-25    134[L]  |  101  |  17  ----------------------------<  139[H]  4.2   |  23  |  0.97    Ca    8.8      25 Oct 2024 04:54  Phos  3.5     10-25  Mg     2.0     10-25    POCT Blood Glucose.: 135 mg/dL (10-25-24 @ 06:56)

## 2024-10-26 ENCOUNTER — TRANSCRIPTION ENCOUNTER (OUTPATIENT)
Age: 34
End: 2024-10-26

## 2024-10-26 VITALS
HEART RATE: 71 BPM | OXYGEN SATURATION: 93 % | RESPIRATION RATE: 17 BRPM | SYSTOLIC BLOOD PRESSURE: 107 MMHG | DIASTOLIC BLOOD PRESSURE: 64 MMHG | TEMPERATURE: 99 F

## 2024-10-26 LAB
ANION GAP SERPL CALC-SCNC: 10 MMOL/L — SIGNIFICANT CHANGE UP (ref 5–17)
BUN SERPL-MCNC: 18 MG/DL — SIGNIFICANT CHANGE UP (ref 7–23)
CALCIUM SERPL-MCNC: 8.8 MG/DL — SIGNIFICANT CHANGE UP (ref 8.4–10.5)
CHLORIDE SERPL-SCNC: 103 MMOL/L — SIGNIFICANT CHANGE UP (ref 96–108)
CO2 SERPL-SCNC: 24 MMOL/L — SIGNIFICANT CHANGE UP (ref 22–31)
CREAT SERPL-MCNC: 0.98 MG/DL — SIGNIFICANT CHANGE UP (ref 0.5–1.3)
EGFR: 105 ML/MIN/1.73M2 — SIGNIFICANT CHANGE UP
GLUCOSE BLDC GLUCOMTR-MCNC: 117 MG/DL — HIGH (ref 70–99)
GLUCOSE SERPL-MCNC: 126 MG/DL — HIGH (ref 70–99)
HCT VFR BLD CALC: 43.1 % — SIGNIFICANT CHANGE UP (ref 39–50)
HGB BLD-MCNC: 14.5 G/DL — SIGNIFICANT CHANGE UP (ref 13–17)
MAGNESIUM SERPL-MCNC: 2.5 MG/DL — SIGNIFICANT CHANGE UP (ref 1.6–2.6)
MCHC RBC-ENTMCNC: 30.3 PG — SIGNIFICANT CHANGE UP (ref 27–34)
MCHC RBC-ENTMCNC: 33.6 GM/DL — SIGNIFICANT CHANGE UP (ref 32–36)
MCV RBC AUTO: 90.2 FL — SIGNIFICANT CHANGE UP (ref 80–100)
NRBC # BLD: 0 /100 WBCS — SIGNIFICANT CHANGE UP (ref 0–0)
PHOSPHATE SERPL-MCNC: 3.7 MG/DL — SIGNIFICANT CHANGE UP (ref 2.5–4.5)
PLATELET # BLD AUTO: 348 K/UL — SIGNIFICANT CHANGE UP (ref 150–400)
POTASSIUM SERPL-MCNC: 4.2 MMOL/L — SIGNIFICANT CHANGE UP (ref 3.5–5.3)
POTASSIUM SERPL-SCNC: 4.2 MMOL/L — SIGNIFICANT CHANGE UP (ref 3.5–5.3)
RBC # BLD: 4.78 M/UL — SIGNIFICANT CHANGE UP (ref 4.2–5.8)
RBC # FLD: 12.9 % — SIGNIFICANT CHANGE UP (ref 10.3–14.5)
SODIUM SERPL-SCNC: 137 MMOL/L — SIGNIFICANT CHANGE UP (ref 135–145)
WBC # BLD: 14.57 K/UL — HIGH (ref 3.8–10.5)
WBC # FLD AUTO: 14.57 K/UL — HIGH (ref 3.8–10.5)

## 2024-10-26 PROCEDURE — 86850 RBC ANTIBODY SCREEN: CPT

## 2024-10-26 PROCEDURE — 94660 CPAP INITIATION&MGMT: CPT

## 2024-10-26 PROCEDURE — 83735 ASSAY OF MAGNESIUM: CPT

## 2024-10-26 PROCEDURE — 85730 THROMBOPLASTIN TIME PARTIAL: CPT

## 2024-10-26 PROCEDURE — 85610 PROTHROMBIN TIME: CPT

## 2024-10-26 PROCEDURE — 36415 COLL VENOUS BLD VENIPUNCTURE: CPT

## 2024-10-26 PROCEDURE — 80048 BASIC METABOLIC PNL TOTAL CA: CPT

## 2024-10-26 PROCEDURE — 99233 SBSQ HOSP IP/OBS HIGH 50: CPT

## 2024-10-26 PROCEDURE — 82962 GLUCOSE BLOOD TEST: CPT

## 2024-10-26 PROCEDURE — 84100 ASSAY OF PHOSPHORUS: CPT

## 2024-10-26 PROCEDURE — 85027 COMPLETE CBC AUTOMATED: CPT

## 2024-10-26 PROCEDURE — 86901 BLOOD TYPING SEROLOGIC RH(D): CPT

## 2024-10-26 PROCEDURE — 86900 BLOOD TYPING SEROLOGIC ABO: CPT

## 2024-10-26 RX ADMIN — HEPARIN SODIUM 7500 UNIT(S): 10000 INJECTION INTRAVENOUS; SUBCUTANEOUS at 05:22

## 2024-10-26 RX ADMIN — DEXAMETHASONE 1.5 MG 6 MILLIGRAM(S): 1.5 TABLET ORAL at 05:22

## 2024-10-26 RX ADMIN — CHLORHEXIDINE GLUCONATE 1 APPLICATION(S): 40 SOLUTION TOPICAL at 05:17

## 2024-10-26 NOTE — PROGRESS NOTE ADULT - ASSESSMENT
34M with PMH MARKOS, MO, GERD and no PSH who presented to [facility] for elective uvulopalatopharyngoplasty and tonsillectomy in setting of MARKOS and possible chronic cryptic tonsilitis. Pt now s/p uvulopalatopharyngoplasty and tonsillectomy (10/24) c/b dysphagia in PACU without stridor. Transferred to [facility] SICU for overnight airway monitoring. Healing well -plan for D/C this AM.    Neuro: Pain: Tylenol, toradol PRN and Hurricaine throat spray.   HEENT: Pallatal and posterior pharynx swelling: was given viscous lidocaine and Decadron 6q8 standing. - will stop on D/C.  CV: HD stable, euvolemic, well-perfused. No significant cardiac history  Pulm: Hx of MARKOS-- BiPAP at night. Team wiill arrange information for outpatient follow-up with sleep specialist. Prior at-home study was read by Dr. Janusz Almaguer.   GI: FLD   : Voids  ID: Not indicated  Endo: ISS  PPx: SCD, SQH  Lines: PIVs  Wounds: Oral Incisions c/d/i, No drains   PT/OT: Not needed  Dispo: SDU vs home     34M with PMH MARKOS, MO, GERD and no PSH who presented to [facility] for elective uvulopalatopharyngoplasty and tonsillectomy in setting of MARKOS and possible chronic cryptic tonsilitis. Pt now s/p uvulopalatopharyngoplasty and tonsillectomy (10/24) c/b dysphagia in PACU without stridor. Transferred to [facility] SICU for overnight airway monitoring. Healing well -plan for D/C this AM.    Neuro: Pain: Tylenol, toradol PRN and Hurricaine throat spray.   HEENT: Palatal and posterior pharynx swelling: was given viscous lidocaine. D/C [place]  CV: HD stable, euvolemic, well-perfused. No significant cardiac history  Pulm: Hx of MARKOS-- BiPAP at night. Team wiill arrange information for outpatient follow-up with sleep specialist. Prior at-home study was read by Dr. Janusz Almaguer.   GI: FLD   : Voids  ID: Not indicated  Endo: ISS  PPx: SCD, SQH  Lines: PIVs  Wounds: Oral Incisions c/d/i, No drains   PT/OT: Not needed  Dispo: SDU vs home

## 2024-10-26 NOTE — DISCHARGE NOTE PROVIDER - CARE PROVIDER_API CALL
Flor Beatty  Otolaryngology  [address]  [place] [address]  Phone: [phone]  Fax: [phone]  Established Patient  Follow Up Time:

## 2024-10-26 NOTE — DISCHARGE NOTE PROVIDER - NSDCMRMEDTOKEN_GEN_ALL_CORE_FT
acetaminophen 160 mg/5 mL oral liquid: 20 milliliter(s) orally every 6 hours  amoxicillin 400 mg/5 mL oral liquid: 10 milliliter(s) orally 2 times a day  gabapentin 250 mg/5 mL oral solution: 6 milliliter(s) orally 3 times a day  ibuprofen 100 mg/5 mL oral suspension: 20 milliliter(s) orally every 6 hours  lidocaine 2% mucous membrane solution: Apply topically to affected area every 6 hours  Medrol Dosepak 4 mg oral tablet: 1 each orally once a day follow instructions on packet

## 2024-10-26 NOTE — DISCHARGE NOTE PROVIDER - NSDCFUADDINST_GEN_ALL_CORE_FT
Diet   For the next 2 weeks:  Soft diet (nothing rough, sharp or hard, such as chips or pretzels)  Food should be at room temperature, not too hot  Avoid acidic foods  Encourage drinking, especially cold liquids  Keep a glass of water at the bedside and drink regularly if awake during the night  Stay well hydrated    Pain Control: Take medications as prescribed.    Medications: Please resume home medications. Please complete course of amoxicillin.     Activity  Avoid strenuous activity or heavy lifting for 2 weeks after surgery.      Please call with any concerns

## 2024-10-26 NOTE — PROGRESS NOTE ADULT - ASSESSMENT
Assessment and Plan:  RADHA LINARES is a  34yM MARKOS s/p tonsillectomy and UPPP 10/24 at [facility], transferred to [facility] due to uvular edema, pain, and inability to tolerate PO. Patient transferred to ICU for O2 requirements.  Now POD2, with significant improvement noted, no desaturations on room air; tolerating PO, pain with improved control.     Plan   - CLD - advance as tolerated   - Can stop steroids  - opioids PRN for pain  - ok for toradol PRN for breakthrough  - viscous lidocaine PRN  - Ok for CPAP if needed  - Discharge today

## 2024-10-26 NOTE — DISCHARGE NOTE PROVIDER - NSDCCPCAREPLAN_GEN_ALL_CORE_FT
PRINCIPAL DISCHARGE DIAGNOSIS  Diagnosis: S/P UPPP (uvulopalatopharyngoplasty)  Assessment and Plan of Treatment:       SECONDARY DISCHARGE DIAGNOSES  Diagnosis: Obstructive sleep apnea  Assessment and Plan of Treatment:

## 2024-10-26 NOTE — DISCHARGE NOTE PROVIDER - HOSPITAL COURSE
RADHA LINARES is 34yM with MARKOS s/p tonsillectomy and UPPP at [facility] 10/24 transferred to [facility] for overnight monitoring due to uvular edema, pain, and inability to tolerate PO. Patient was transferred for ICU level of care due to O2 requirements in setting of uvula edema. By POD2 he was breathing comfortably on room air with improved pain tolerance, and improvement noted in oropharyngeal edema. Tolerating PO, and discharge to home on POD2.

## 2024-10-26 NOTE — PROGRESS NOTE ADULT - SUBJECTIVE AND OBJECTIVE BOX
OTOLARYNGOLOGY (ENT) PROGRESS NOTE    PATIENT: RADHA LINARES  MRN: [id number]  : [birth year]  [id number]  DATE OF SERVICE:  10-26-24  			         ID:RADHA LINARES is 34yM with MARKOS s/p tonsillectomy and UPPP at [facility] 10/24 transferred to [facility] for overnight monitoring due to uvular edema, pain, and inability to tolerate PO. Patient was transferred for ICU level of care due to O2 requirements in setting of uvula edema. On arrival patient is comfortable and breathing comfortably on 2L NC satting 96%. Reports that the pain is unchanged. He reports difficulty breathing through his nose but can breathe well through his mouth. No stridor or stertor.     Subjective/ Interval:   10/25; AFVSS, no recorded desats overnight, on 2 L NC this morning, Patient laying flat with no stertor or stridor, Aim to try PO today, normal post op edema, no bleeding   10/26: AFVSS, no recorded desats on room air. Improved pain control, PO intake, breathing comfortably, no bleeding.     ALLERGIES:  No Known Allergies      MEDICATIONS:  Antiinfectives:     IV fluids:  dextrose 5%. 1000 milliLiter(s) IV Continuous <Continuous>  dextrose 5%. 1000 milliLiter(s) IV Continuous <Continuous>    Hematologic/Anticoagulation:  heparin   Injectable 7500 Unit(s) SubCutaneous every 8 hours    Pain medications/Neuro:  acetaminophen   IVPB .. 1000 milliGRAM(s) IV Intermittent every 6 hours PRN  ketorolac   Injectable 15 milliGRAM(s) IV Push every 6 hours PRN    Endocrine Medications:   dextrose 50% Injectable 12.5 Gram(s) IV Push once  dextrose 50% Injectable 25 Gram(s) IV Push once  dextrose 50% Injectable 25 Gram(s) IV Push once  dextrose Oral Gel 15 Gram(s) Oral once PRN  glucagon  Injectable 1 milliGRAM(s) IntraMuscular once  insulin lispro (ADMELOG) corrective regimen sliding scale   SubCutaneous every 6 hours    All other standing medications:   chlorhexidine 2% Cloths 1 Application(s) Topical daily  influenza   Vaccine 0.5 milliLiter(s) IntraMuscular once    All other PRN medications:  benzocaine/menthol Lozenge 1 Lozenge Oral every 2 hours PRN    Vital Signs Last 24 Hrs  T(C): 37.1 (26 Oct 2024 09:00), Max: 37.4 (25 Oct 2024 21:48)  T(F): 98.8 (26 Oct 2024 09:00), Max: 99.3 (25 Oct 2024 21:48)  HR: 74 (26 Oct 2024 08:41) (56 - 96)  BP: 134/84 (26 Oct 2024 05:00) (113/56 - 142/90)  BP(mean): 101 (26 Oct 2024 05:00) (80 - 111)  RR: 17 (26 Oct 2024 08:41) (14 - 25)  SpO2: 95% (26 Oct 2024 08:41) (93% - 97%)    Parameters below as of 26 Oct 2024 09:00  Patient On (Oxygen Delivery Method): room air          10-25 @ 07:01  -  10-26 @ 07:00  --------------------------------------------------------  IN:    Lactated Ringers: 300 mL  Total IN: 300 mL    OUT:    Voided (mL): 2775 mL  Total OUT: 2775 mL    Total NET: -2475 mL          PHYSICAL EXAM:  GEN: appears stated age, NAD  NEURO: alert & oriented x 4/  NOSE: Normal external nose. Anterior nasal cavity patent with no obstruction. Inferior turbinates normally sized.  ORAL CAVITY/OROPHARYNX: s/p tonsillectomy and UPPP. No bleeding. Decreased uvular and soft palate edema, improved visualization of posterior oropharynx.   NECK: No cervical lymphadenopathy  RESPIRATORY: Respirations unlabored, no increased work of breathing with use of accessory muscles and retractions. No stridor.  CARDIAC: Warm extremities, no cyanosis.           LABS                       14.5   14.57 )-----------( 348      ( 26 Oct 2024 05:30 )             43.1    10-26    137  |  103  |  18  ----------------------------<  126[H]  4.2   |  24  |  0.98    Ca    8.8      26 Oct 2024 05:30  Phos  3.7     10-26  Mg     2.5     10-26           Coagulation Studies-   PT/INR - ( 24 Oct 2024 20:46 )   PT: 12.0 sec;   INR: 1.04          PTT - ( 24 Oct 2024 20:46 )  PTT:34.9 sec       Endocrine Panel-  Calcium: 8.8 mg/dL (10-26 @ 05:30)

## 2024-10-26 NOTE — DISCHARGE NOTE NURSING/CASE MANAGEMENT/SOCIAL WORK - PATIENT PORTAL LINK FT
You can access the FollowMyHealth Patient Portal offered by [facility] by registering at the following website: [email or web address]. By joining [facility]’s FollowMyHealth portal, you will also be able to view your health information using other applications (apps) compatible with our system.

## 2024-10-26 NOTE — DISCHARGE NOTE NURSING/CASE MANAGEMENT/SOCIAL WORK - NSDCPEFALRISK_GEN_ALL_CORE
For information on Fall & Injury Prevention, visit: [email or web address] OR  [email or web address] OR  https://www.cdc.gov/steadi/patient.html

## 2024-10-26 NOTE — DISCHARGE NOTE NURSING/CASE MANAGEMENT/SOCIAL WORK - FINANCIAL ASSISTANCE
[facility] provides services at a reduced cost to those who are determined to be eligible through [facility]’s financial assistance program. Information regarding [facility]’s financial assistance program can be found by going to [email or web address] or by calling [phone].

## 2024-10-26 NOTE — PROGRESS NOTE ADULT - SUBJECTIVE AND OBJECTIVE BOX
ON:       SUBJECTIVE: Pt seen and examined at bedside this am by ICU team. Patient is lying comfortably in bed with no complaints. Tolerating diet, pain well controlled with current regimen. Patient denies fever, nausea, vomiting, chest pain, and shortness of breath. Patient is passing gas and having bowel movements.       MEDICATIONS  (STANDING):  chlorhexidine 2% Cloths 1 Application(s) Topical daily  dextrose 5%. 1000 milliLiter(s) (100 mL/Hr) IV Continuous <Continuous>  dextrose 5%. 1000 milliLiter(s) (50 mL/Hr) IV Continuous <Continuous>  dextrose 50% Injectable 12.5 Gram(s) IV Push once  dextrose 50% Injectable 25 Gram(s) IV Push once  dextrose 50% Injectable 25 Gram(s) IV Push once  glucagon  Injectable 1 milliGRAM(s) IntraMuscular once  heparin   Injectable 7500 Unit(s) SubCutaneous every 8 hours  influenza   Vaccine 0.5 milliLiter(s) IntraMuscular once  insulin lispro (ADMELOG) corrective regimen sliding scale   SubCutaneous every 6 hours    MEDICATIONS  (PRN):  acetaminophen   IVPB .. 1000 milliGRAM(s) IV Intermittent every 6 hours PRN Mild Pain (1 - 3)  benzocaine/menthol Lozenge 1 Lozenge Oral every 2 hours PRN Sore Throat  dextrose Oral Gel 15 Gram(s) Oral once PRN Blood Glucose LESS THAN 70 milliGRAM(s)/deciliter  ketorolac   Injectable 15 milliGRAM(s) IV Push every 6 hours PRN Mild Pain (1 - 3)      Drips:     ICU Vital Signs Last 24 Hrs  T(C): 37.1 (26 Oct 2024 09:00), Max: 37.4 (25 Oct 2024 21:48)  T(F): 98.8 (26 Oct 2024 09:00), Max: 99.3 (25 Oct 2024 21:48)  HR: 75 (26 Oct 2024 08:00) (56 - 96)  BP: 134/84 (26 Oct 2024 05:00) (113/56 - 142/90)  BP(mean): 101 (26 Oct 2024 05:00) (80 - 111)  ABP: --  ABP(mean): --  RR: 16 (26 Oct 2024 08:00) (14 - 25)  SpO2: 96% (26 Oct 2024 08:00) (93% - 97%)    O2 Parameters below as of 26 Oct 2024 09:00  Patient On (Oxygen Delivery Method): room air            Physical Exam:  General: NAD  HEENT: NC/AT, EOMI, PERRLA, normal hearing, no oral lesions, neck supple w/o LAD  Pulmonary: Nonlabored breathing, no respiratory distress, CTA-B  Cardiovascular: NSR, no murmurs  Abdominal: soft, NT/ND, +BS, no organomegaly  Extremities: WWP, 5/5 strength x 4, no clubbing/cyanosis/edema  Neuro: A/O x3, CNs II-XII grossly intact, normal motor/sensation, no focal deficits  Pulses: palpable distal pulses    Lines/tubes/drains:  Busch:	      Vent settings:      I&O's Summary    25 Oct 2024 07:01  -  26 Oct 2024 07:00  --------------------------------------------------------  IN: 300 mL / OUT: 2775 mL / NET: -2475 mL        LABS:                        14.5   14.57 )-----------( 348      ( 26 Oct 2024 05:30 )             43.1     10-26    137  |  103  |  18  ----------------------------<  126[H]  4.2   |  24  |  0.98    Ca    8.8      26 Oct 2024 05:30  Phos  3.7     10-26  Mg     2.5     10-26      PT/INR - ( 24 Oct 2024 20:46 )   PT: 12.0 sec;   INR: 1.04          PTT - ( 24 Oct 2024 20:46 )  PTT:34.9 sec  Urinalysis Basic - ( 26 Oct 2024 05:30 )    Color: x / Appearance: x / SG: x / pH: x  Gluc: 126 mg/dL / Ketone: x  / Bili: x / Urobili: x   Blood: x / Protein: x / Nitrite: x   Leuk Esterase: x / RBC: x / WBC x   Sq Epi: x / Non Sq Epi: x / Bacteria: x      CAPILLARY BLOOD GLUCOSE      POCT Blood Glucose.: 117 mg/dL (26 Oct 2024 00:43)  POCT Blood Glucose.: 124 mg/dL (25 Oct 2024 18:05)  POCT Blood Glucose.: 119 mg/dL (25 Oct 2024 12:02)       SICU PROGRESS NOTE    ON: adv full diet       SUBJECTIVE: Pt seen and examined at bedside this am by ICU team. Denies acute complaints this AM. States he is tolerating diet without nausea or emesis. Denies significant pain in mouth.       MEDICATIONS  (STANDING):  chlorhexidine 2% Cloths 1 Application(s) Topical daily  dextrose 5%. 1000 milliLiter(s) (100 mL/Hr) IV Continuous <Continuous>  dextrose 5%. 1000 milliLiter(s) (50 mL/Hr) IV Continuous <Continuous>  dextrose 50% Injectable 12.5 Gram(s) IV Push once  dextrose 50% Injectable 25 Gram(s) IV Push once  dextrose 50% Injectable 25 Gram(s) IV Push once  glucagon  Injectable 1 milliGRAM(s) IntraMuscular once  heparin   Injectable 7500 Unit(s) SubCutaneous every 8 hours  influenza   Vaccine 0.5 milliLiter(s) IntraMuscular once  insulin lispro (ADMELOG) corrective regimen sliding scale   SubCutaneous every 6 hours    MEDICATIONS  (PRN):  acetaminophen   IVPB .. 1000 milliGRAM(s) IV Intermittent every 6 hours PRN Mild Pain (1 - 3)  benzocaine/menthol Lozenge 1 Lozenge Oral every 2 hours PRN Sore Throat  dextrose Oral Gel 15 Gram(s) Oral once PRN Blood Glucose LESS THAN 70 milliGRAM(s)/deciliter  ketorolac   Injectable 15 milliGRAM(s) IV Push every 6 hours PRN Mild Pain (1 - 3)    ICU Vital Signs Last 24 Hrs  T(C): 37.1 (26 Oct 2024 09:00), Max: 37.4 (25 Oct 2024 21:48)  T(F): 98.8 (26 Oct 2024 09:00), Max: 99.3 (25 Oct 2024 21:48)  HR: 75 (26 Oct 2024 08:00) (56 - 96)  BP: 134/84 (26 Oct 2024 05:00) (113/56 - 142/90)  BP(mean): 101 (26 Oct 2024 05:00) (80 - 111)  RR: 16 (26 Oct 2024 08:00) (14 - 25)  SpO2: 96% (26 Oct 2024 08:00) (93% - 97%)    O2 Parameters below as of 26 Oct 2024 09:00  Patient On (Oxygen Delivery Method): room air            Physical Exam:  General: Resting in bed, NAD  HEENT: Incision on soft palate CDI with sutures in place - no drainage. Neck supple without JVD  Pulmonary: Nonlabored breathing, no respiratory distress, CTA-B  Cardiovascular: NSR, no murmurs  Abdominal: soft, NT/ND, +BS, no organomegaly  Extremities: WWP, 5/5 strength x 4, no clubbing/cyanosis/edema  Neuro: A/O x3, CNs II-XII grossly intact, normal motor/sensation, no focal deficits  Pulses: palpable distal pulses    I&O's Summary    25 Oct 2024 07:01  -  26 Oct 2024 07:00  --------------------------------------------------------  IN: 300 mL / OUT: 2775 mL / NET: -2475 mL        LABS:                        14.5   14.57 )-----------( 348      ( 26 Oct 2024 05:30 )             43.1     10-26    137  |  103  |  18  ----------------------------<  126[H]  4.2   |  24  |  0.98    Ca    8.8      26 Oct 2024 05:30  Phos  3.7     10-26  Mg     2.5     10-26      PT/INR - ( 24 Oct 2024 20:46 )   PT: 12.0 sec;   INR: 1.04          PTT - ( 24 Oct 2024 20:46 )  PTT:34.9 sec  Urinalysis Basic - ( 26 Oct 2024 05:30 )    Color: x / Appearance: x / SG: x / pH: x  Gluc: 126 mg/dL / Ketone: x  / Bili: x / Urobili: x   Blood: x / Protein: x / Nitrite: x   Leuk Esterase: x / RBC: x / WBC x   Sq Epi: x / Non Sq Epi: x / Bacteria: x      CAPILLARY BLOOD GLUCOSE      POCT Blood Glucose.: 117 mg/dL (26 Oct 2024 00:43)  POCT Blood Glucose.: 124 mg/dL (25 Oct 2024 18:05)  POCT Blood Glucose.: 119 mg/dL (25 Oct 2024 12:02)

## 2024-10-29 ENCOUNTER — NON-APPOINTMENT (OUTPATIENT)
Age: 34
End: 2024-10-29

## 2024-10-29 DIAGNOSIS — Z90.89 ACQUIRED ABSENCE OF OTHER ORGANS: ICD-10-CM

## 2024-10-29 DIAGNOSIS — Z98.890 OTHER SPECIFIED POSTPROCEDURAL STATES: ICD-10-CM

## 2024-10-29 RX ORDER — OXYCODONE 5 MG/1
5 TABLET ORAL
Qty: 16 | Refills: 0 | Status: ACTIVE | COMMUNITY
Start: 2024-10-29 | End: 1900-01-01

## 2024-10-31 LAB — SURGICAL PATHOLOGY STUDY: SIGNIFICANT CHANGE UP

## 2024-11-02 DIAGNOSIS — Z98.890 OTHER SPECIFIED POSTPROCEDURAL STATES: ICD-10-CM

## 2024-11-02 DIAGNOSIS — E66.9 OBESITY, UNSPECIFIED: ICD-10-CM

## 2024-11-02 DIAGNOSIS — G47.33 OBSTRUCTIVE SLEEP APNEA (ADULT) (PEDIATRIC): ICD-10-CM

## 2024-11-02 DIAGNOSIS — Z91.198 PATIENT'S NONCOMPLIANCE WITH OTHER MEDICAL TREATMENT AND REGIMEN FOR OTHER REASON: ICD-10-CM

## 2024-11-02 DIAGNOSIS — R13.10 DYSPHAGIA, UNSPECIFIED: ICD-10-CM

## 2024-11-02 DIAGNOSIS — J39.2 OTHER DISEASES OF PHARYNX: ICD-10-CM

## 2024-11-02 DIAGNOSIS — K21.9 GASTRO-ESOPHAGEAL REFLUX DISEASE WITHOUT ESOPHAGITIS: ICD-10-CM

## 2024-11-13 ENCOUNTER — APPOINTMENT (OUTPATIENT)
Dept: OTOLARYNGOLOGY | Facility: CLINIC | Age: 34
End: 2024-11-13
Payer: COMMERCIAL

## 2024-11-13 ENCOUNTER — NON-APPOINTMENT (OUTPATIENT)
Age: 34
End: 2024-11-13

## 2024-11-13 VITALS
HEART RATE: 73 BPM | WEIGHT: 261 LBS | SYSTOLIC BLOOD PRESSURE: 108 MMHG | DIASTOLIC BLOOD PRESSURE: 74 MMHG | HEIGHT: 69 IN | TEMPERATURE: 97 F | BODY MASS INDEX: 38.66 KG/M2

## 2024-11-13 DIAGNOSIS — Z98.890 OTHER SPECIFIED POSTPROCEDURAL STATES: ICD-10-CM

## 2024-11-13 DIAGNOSIS — Z90.89 ACQUIRED ABSENCE OF OTHER ORGANS: ICD-10-CM

## 2024-11-13 DIAGNOSIS — G89.29 PAIN IN THROAT: ICD-10-CM

## 2024-11-13 DIAGNOSIS — J35.01 CHRONIC TONSILLITIS: ICD-10-CM

## 2024-11-13 DIAGNOSIS — K21.00 GASTRO-ESOPHAGEAL REFLUX DISEASE WITH ESOPHAGITIS, WITHOUT BLEEDING: ICD-10-CM

## 2024-11-13 DIAGNOSIS — G47.33 OBSTRUCTIVE SLEEP APNEA (ADULT) (PEDIATRIC): ICD-10-CM

## 2024-11-13 DIAGNOSIS — J35.1 HYPERTROPHY OF TONSILS: ICD-10-CM

## 2024-11-13 DIAGNOSIS — R19.6 HALITOSIS: ICD-10-CM

## 2024-11-13 DIAGNOSIS — R07.0 PAIN IN THROAT: ICD-10-CM

## 2024-11-13 DIAGNOSIS — Z87.898 PERSONAL HISTORY OF OTHER SPECIFIED CONDITIONS: ICD-10-CM

## 2024-11-13 PROCEDURE — 99024 POSTOP FOLLOW-UP VISIT: CPT

## 2024-12-20 ENCOUNTER — APPOINTMENT (OUTPATIENT)
Dept: SLEEP CENTER | Facility: HOME HEALTH | Age: 34
End: 2024-12-20

## 2025-03-13 ENCOUNTER — APPOINTMENT (OUTPATIENT)
Dept: OTOLARYNGOLOGY | Facility: CLINIC | Age: 35
End: 2025-03-13

## 2025-03-13 VITALS — HEIGHT: 72 IN | WEIGHT: 270 LBS | BODY MASS INDEX: 36.57 KG/M2

## 2025-03-13 DIAGNOSIS — Z98.890 OTHER SPECIFIED POSTPROCEDURAL STATES: ICD-10-CM

## 2025-03-13 DIAGNOSIS — K21.00 GASTRO-ESOPHAGEAL REFLUX DISEASE WITH ESOPHAGITIS, WITHOUT BLEEDING: ICD-10-CM

## 2025-03-13 DIAGNOSIS — J31.2 CHRONIC PHARYNGITIS: ICD-10-CM

## 2025-03-13 DIAGNOSIS — G47.33 OBSTRUCTIVE SLEEP APNEA (ADULT) (PEDIATRIC): ICD-10-CM

## 2025-03-13 PROCEDURE — 99214 OFFICE O/P EST MOD 30 MIN: CPT

## 2025-03-13 RX ORDER — OMEPRAZOLE 40 MG/1
40 CAPSULE, DELAYED RELEASE ORAL
Qty: 90 | Refills: 3 | Status: ACTIVE | COMMUNITY
Start: 2025-03-13 | End: 1900-01-01

## 2025-03-13 RX ORDER — FAMOTIDINE 40 MG/1
40 TABLET, FILM COATED ORAL
Qty: 90 | Refills: 3 | Status: ACTIVE | COMMUNITY
Start: 2025-03-13 | End: 1900-01-01

## (undated) DEVICE — APPLICATOR ESWAB 1ML LIQUID AMIES REG

## (undated) DEVICE — GOWN ROYAL SILK XL

## (undated) DEVICE — STICK SPONGE 1.5" ROUND

## (undated) DEVICE — SUT CHROMIC 4-0 27" SH-1

## (undated) DEVICE — PETRI DISH MED 3.5"

## (undated) DEVICE — WARMING BLANKET LOWER ADULT

## (undated) DEVICE — ELCTR BOVIE PENCIL BLADE 10FT

## (undated) DEVICE — SPONGE TONSIL 2 STRUNG MED 7/8

## (undated) DEVICE — PACK TONSIL ADENOID

## (undated) DEVICE — GLV 7.5 PROTEXIS (WHITE)

## (undated) DEVICE — ELCTR BOVIE SUCTION 8FR 6"

## (undated) DEVICE — NDL HYPO REGULAR BEVEL 25G X 1.5" (BLUE)

## (undated) DEVICE — TUBING RAPIDVAC SMOKE EVACUATOR .25" X 10FT

## (undated) DEVICE — ELCTR BOVIE TIP BLADE INSULATED 2.75" EDGE

## (undated) DEVICE — SUT QUILL PDO 2 36CM 40MM

## (undated) DEVICE — SYR LUER LOK 10CC

## (undated) DEVICE — VENODYNE/SCD SLEEVE CALF MEDIUM

## (undated) DEVICE — DRAPE MAYO STAND 23"

## (undated) DEVICE — SUT VICRYL 4-0 18" PS-2 UNDYED